# Patient Record
Sex: FEMALE | Race: OTHER | HISPANIC OR LATINO
[De-identification: names, ages, dates, MRNs, and addresses within clinical notes are randomized per-mention and may not be internally consistent; named-entity substitution may affect disease eponyms.]

---

## 2023-10-02 PROBLEM — Z00.00 ENCOUNTER FOR PREVENTIVE HEALTH EXAMINATION: Status: ACTIVE | Noted: 2023-10-02

## 2023-10-12 ENCOUNTER — NON-APPOINTMENT (OUTPATIENT)
Age: 33
End: 2023-10-12

## 2023-10-12 ENCOUNTER — APPOINTMENT (OUTPATIENT)
Dept: BARIATRICS | Facility: CLINIC | Age: 33
End: 2023-10-12
Payer: MEDICAID

## 2023-10-12 VITALS
BODY MASS INDEX: 44.93 KG/M2 | DIASTOLIC BLOOD PRESSURE: 74 MMHG | HEART RATE: 67 BPM | TEMPERATURE: 96.7 F | WEIGHT: 238 LBS | HEIGHT: 61 IN | SYSTOLIC BLOOD PRESSURE: 116 MMHG | OXYGEN SATURATION: 99 %

## 2023-10-12 DIAGNOSIS — Z78.9 OTHER SPECIFIED HEALTH STATUS: ICD-10-CM

## 2023-10-12 DIAGNOSIS — E66.01 MORBID (SEVERE) OBESITY DUE TO EXCESS CALORIES: ICD-10-CM

## 2023-10-12 DIAGNOSIS — R63.5 ABNORMAL WEIGHT GAIN: ICD-10-CM

## 2023-10-12 DIAGNOSIS — Z01.818 ENCOUNTER FOR OTHER PREPROCEDURAL EXAMINATION: ICD-10-CM

## 2023-10-12 DIAGNOSIS — Z97.5 PRESENCE OF (INTRAUTERINE) CONTRACEPTIVE DEVICE: ICD-10-CM

## 2023-10-12 DIAGNOSIS — Z86.39 PERSONAL HISTORY OF OTHER ENDOCRINE, NUTRITIONAL AND METABOLIC DISEASE: ICD-10-CM

## 2023-10-12 PROCEDURE — 99205 OFFICE O/P NEW HI 60 MIN: CPT

## 2023-11-02 ENCOUNTER — APPOINTMENT (OUTPATIENT)
Dept: BARIATRICS/WEIGHT MGMT | Facility: CLINIC | Age: 33
End: 2023-11-02
Payer: MEDICAID

## 2023-11-02 VITALS — BODY MASS INDEX: 44.93 KG/M2 | WEIGHT: 238 LBS | HEIGHT: 61 IN

## 2023-11-02 PROCEDURE — 97802 MEDICAL NUTRITION INDIV IN: CPT

## 2023-12-04 ENCOUNTER — APPOINTMENT (OUTPATIENT)
Dept: BARIATRICS/WEIGHT MGMT | Facility: CLINIC | Age: 33
End: 2023-12-04

## 2023-12-13 ENCOUNTER — APPOINTMENT (OUTPATIENT)
Dept: BARIATRICS | Facility: CLINIC | Age: 33
End: 2023-12-13
Payer: MEDICAID

## 2023-12-13 VITALS
SYSTOLIC BLOOD PRESSURE: 114 MMHG | WEIGHT: 239 LBS | OXYGEN SATURATION: 99 % | BODY MASS INDEX: 45.12 KG/M2 | DIASTOLIC BLOOD PRESSURE: 68 MMHG | HEART RATE: 71 BPM | HEIGHT: 61 IN | TEMPERATURE: 96.7 F

## 2023-12-13 PROCEDURE — 99213 OFFICE O/P EST LOW 20 MIN: CPT

## 2023-12-13 NOTE — HISTORY OF PRESENT ILLNESS
[de-identified] : This is a 33 year-year-old morbidly obese woman presenting today to discuss surgical options for weight loss. Despite multiple efforts at diet and exercise this patient has been unable to achieve and/or maintain significant weight loss.  Works in a [employer].

## 2023-12-13 NOTE — PHYSICAL EXAM
[Obese, well nourished, in no acute distress] : obese, well nourished, in no acute distress [Normal] : affect appropriate [de-identified] : Obese, soft, nontender, nondistended, positive bowel sounds in all four quadrants.  No hernia or masses.

## 2023-12-13 NOTE — REASON FOR VISIT
[Follow-Up Visit] : a follow-up visit for [Morbid Obesity (BMI>40)] : morbid obesity (bmi>40) [[employer] ] : provided by [employer]   [Interpreters_IDNumber] : [id number] [Interpreters_FullName] : Pao [TWNoteComboBox1] : Pakistani

## 2023-12-13 NOTE — ASSESSMENT
[FreeTextEntry1] : Ongoing preoperative assessment in preparation for laparoscopic sleeve gastrectomy.  Patient has made little progress since her initial consultation she returns today without significant weight change.  Up 1 pound.  Body mass index of 45.16 kg/m.  Patient has undergone nutritional consultation.  Still pending however includes a letter of support, diet history, blood work, endoscopy, pulmonary clearance, cardiac clearance.  Several of these appointments have been scheduled.  The patient reports her endoscopy to be scheduled for February 15, she is due to see the pulmonologist on January 28 and the cardiologist on January 29.  Once again, Nutritional and weight loss counseling has been provided. The importance of weight reduction in the treatment of Obesity and its contribution to resolution of obesity related comorbidities has been discussed.  The patient is encouraged to remain calorie conscious and continue a low fat, low carbohydrate, high protein diet. Eat slowly and chew food well.  Avoid eating and drinking simultaneously.  Also, emphasis has been placed on the importance of adequate hydration, multi-vitamin supplementation and exercise.  (15 min)  f/u in one month.

## 2023-12-14 ENCOUNTER — APPOINTMENT (OUTPATIENT)
Dept: BARIATRICS | Facility: CLINIC | Age: 33
End: 2023-12-14

## 2024-01-10 ENCOUNTER — APPOINTMENT (OUTPATIENT)
Dept: BARIATRICS/WEIGHT MGMT | Facility: CLINIC | Age: 34
End: 2024-01-10

## 2024-01-11 ENCOUNTER — APPOINTMENT (OUTPATIENT)
Dept: BARIATRICS | Facility: CLINIC | Age: 34
End: 2024-01-11

## 2024-01-11 ENCOUNTER — APPOINTMENT (OUTPATIENT)
Dept: BARIATRICS/WEIGHT MGMT | Facility: CLINIC | Age: 34
End: 2024-01-11
Payer: MEDICAID

## 2024-01-11 PROCEDURE — 97803 MED NUTRITION INDIV SUBSEQ: CPT

## 2024-01-24 ENCOUNTER — TRANSCRIPTION ENCOUNTER (OUTPATIENT)
Age: 34
End: 2024-01-24

## 2024-01-24 ENCOUNTER — APPOINTMENT (OUTPATIENT)
Dept: BARIATRICS | Facility: CLINIC | Age: 34
End: 2024-01-24
Payer: MEDICAID

## 2024-01-24 VITALS
TEMPERATURE: 96.7 F | DIASTOLIC BLOOD PRESSURE: 74 MMHG | BODY MASS INDEX: 45.88 KG/M2 | WEIGHT: 243 LBS | HEART RATE: 78 BPM | HEIGHT: 61 IN | OXYGEN SATURATION: 99 % | SYSTOLIC BLOOD PRESSURE: 116 MMHG

## 2024-01-24 VITALS
HEART RATE: 78 BPM | SYSTOLIC BLOOD PRESSURE: 116 MMHG | TEMPERATURE: 96.7 F | OXYGEN SATURATION: 99 % | HEIGHT: 61 IN | BODY MASS INDEX: 45.88 KG/M2 | DIASTOLIC BLOOD PRESSURE: 74 MMHG | WEIGHT: 243 LBS

## 2024-01-24 DIAGNOSIS — R63.8 OTHER SYMPTOMS AND SIGNS CONCERNING FOOD AND FLUID INTAKE: ICD-10-CM

## 2024-01-24 PROCEDURE — 99214 OFFICE O/P EST MOD 30 MIN: CPT

## 2024-01-24 NOTE — PHYSICAL EXAM
[Obese] : obese [Normal] : affect appropriate [de-identified] : Equal chest rise, non-labored respirations, no audible wheezing [de-identified] :  Soft, NT, ND, no diastasis or hernias appreciated [de-identified] : BOBY

## 2024-01-24 NOTE — HISTORY OF PRESENT ILLNESS
[de-identified] : 33 year old F presents for follow-up while undergoing workup for Laparoscopic Sleeve Gastrectomy. Weight stable since last visit. Pt continues to try to make better food choices, consuming 2-3 meals/day, sometimes skipping dinner due to work schedule. Trying to drink adequate zero calorie liquid. Pt trying to exercise walk more and goes to gym 2x/week for cardio. No abdominal pain, reflux, nausea, vomiting, constipation or diarrhea. Albanian interpretation provided by [employer] : Jasmyne ID# [id number]

## 2024-01-24 NOTE — ASSESSMENT
[FreeTextEntry1] : 33 year old F presents for follow-up while undergoing workup for Laparoscopic Sleeve Gastrectomy. Weight stable since last visit. Pt continues to try to make better food choices in anticipation of bariatric surgery  Reviewed nutrition and exercise guidelines Protein focus diet and 3 meals/day Increase daily zero calorie liquid intake - goal 64oz/day Increase activities and keep track of steps - goal 8-10k steps/day; continue cardio and add strength training Stress reduction modalities  Continue workup for Laparoscopic Sleeve Gastrectomy Continue multicomponent intervention visits Awaiting labs Cardiology consultation set for 1/29/2024 Awaiting pulmonary and GI appointments Nutritionist Sara Sierra evaluation completed 1/11/2024 Dr. DIANE Heard Psychologist evaluation scheduled for 2/27/2024 All questions answered Additional time spent before and after visit reviewing chart.

## 2024-01-29 ENCOUNTER — NON-APPOINTMENT (OUTPATIENT)
Age: 34
End: 2024-01-29

## 2024-01-29 ENCOUNTER — APPOINTMENT (OUTPATIENT)
Dept: CARDIOLOGY | Facility: CLINIC | Age: 34
End: 2024-01-29
Payer: MEDICAID

## 2024-01-29 VITALS
SYSTOLIC BLOOD PRESSURE: 140 MMHG | DIASTOLIC BLOOD PRESSURE: 78 MMHG | BODY MASS INDEX: 45.12 KG/M2 | HEIGHT: 61 IN | WEIGHT: 239 LBS | OXYGEN SATURATION: 97 % | HEART RATE: 89 BPM

## 2024-01-29 DIAGNOSIS — Z01.810 ENCOUNTER FOR PREPROCEDURAL CARDIOVASCULAR EXAMINATION: ICD-10-CM

## 2024-01-29 PROCEDURE — 93000 ELECTROCARDIOGRAM COMPLETE: CPT | Mod: NC

## 2024-01-29 PROCEDURE — 99204 OFFICE O/P NEW MOD 45 MIN: CPT | Mod: 25

## 2024-02-16 ENCOUNTER — APPOINTMENT (OUTPATIENT)
Dept: CARDIOLOGY | Facility: CLINIC | Age: 34
End: 2024-02-16
Payer: MEDICAID

## 2024-02-16 PROCEDURE — 93015 CV STRESS TEST SUPVJ I&R: CPT

## 2024-02-16 NOTE — DISCUSSION/SUMMARY
[EKG obtained to assist in diagnosis and management of assessed problem(s)] : EKG obtained to assist in diagnosis and management of assessed problem(s) [FreeTextEntry1] : Exercise stress test to eval exercise capacity, pre-op risk stratification. No indication for echo or other additional testing at this time. Clearance pending above.  Addendum 2/16/24 - Exercise stress test negative. No cardiac contraindications for surgery. Patient is at low estimated cardiac risk for bariatric surgery.

## 2024-02-16 NOTE — HISTORY OF PRESENT ILLNESS
[FreeTextEntry1] : RICK CONTEH is a 33 year old female Sami speaking referred for cardiac clearance prior to bariatric surgery. No prior cardiac history. No complaints. She exercises but not regularly. She reports some fatigue and shortness of breath on more severe exertion. No stairs at home.  She works as a cook. Meds - none. Soc - Former cigarette smoking, quit Sept 2023, max 1 ppw. Fam hist - No cardiac history.

## 2024-02-20 VITALS
DIASTOLIC BLOOD PRESSURE: 68 MMHG | TEMPERATURE: 97 F | HEART RATE: 83 BPM | SYSTOLIC BLOOD PRESSURE: 113 MMHG | OXYGEN SATURATION: 98 % | RESPIRATION RATE: 20 BRPM

## 2024-02-20 NOTE — ASU PATIENT PROFILE, ADULT - FALL HARM RISK - UNIVERSAL INTERVENTIONS
Bed in lowest position, wheels locked, appropriate side rails in place/Call bell, personal items and telephone in reach/Instruct patient to call for assistance before getting out of bed or chair/Non-slip footwear when patient is out of bed/[place] to call system/Physically safe environment - no spills, clutter or unnecessary equipment/Purposeful Proactive Rounding/Room/bathroom lighting operational, light cord in reach

## 2024-02-21 ENCOUNTER — OUTPATIENT (OUTPATIENT)
Dept: OUTPATIENT SERVICES | Facility: HOSPITAL | Age: 34
LOS: 1 days | Discharge: HOME | End: 2024-02-21
Payer: MEDICAID

## 2024-02-21 ENCOUNTER — TRANSCRIPTION ENCOUNTER (OUTPATIENT)
Age: 34
End: 2024-02-21

## 2024-02-21 VITALS
RESPIRATION RATE: 15 BRPM | DIASTOLIC BLOOD PRESSURE: 64 MMHG | SYSTOLIC BLOOD PRESSURE: 106 MMHG | OXYGEN SATURATION: 98 % | HEART RATE: 80 BPM

## 2024-02-21 DIAGNOSIS — R12 HEARTBURN: ICD-10-CM

## 2024-02-21 LAB — HCG UR QL: NEGATIVE — SIGNIFICANT CHANGE UP

## 2024-02-21 PROCEDURE — 88305 TISSUE EXAM BY PATHOLOGIST: CPT | Mod: 26

## 2024-02-21 RX ORDER — SODIUM CHLORIDE 9 MG/ML
500 INJECTION, SOLUTION INTRAVENOUS
Refills: 0 | Status: DISCONTINUED | OUTPATIENT
Start: 2024-02-21 | End: 2024-03-06

## 2024-02-21 NOTE — PRE PROCEDURE NOTE - PRE PROCEDURE EVALUATION
Attending Physician:   Ara                         Procedure: EGD    Indication for Procedure: prebariatric surgery; occasional heartburn, postprandial bloating   ________________________________________________________  PAST MEDICAL & SURGICAL HISTORY:    ALLERGIES:    HOME MEDICATIONS:      PERTINENT LAB DATA:                      PHYSICAL EXAMINATION:    T(C): --  HR: --  BP: --  RR: --  SpO2: --    Constitutional: NAD  HEENT: Anicteric, EOMI   Neck:  No JVD  Respiratory: Normal respiratory effort, no accessory muscle use  Cardiovascular: S1 and S2, normal rate  Gastrointestinal: BS+, soft, NT/ND  Extremities: No peripheral edema  Neurological: A/O x 3, no focal deficits  Psychiatric: Normal mood, normal affect  Skin: No rashes on exposed skin    COMMENTS:    The patient is a suitable candidate for the planned procedure unless box checked [ ]  No, explain:

## 2024-02-22 ENCOUNTER — APPOINTMENT (OUTPATIENT)
Dept: BARIATRICS | Facility: CLINIC | Age: 34
End: 2024-02-22
Payer: MEDICAID

## 2024-02-22 VITALS
TEMPERATURE: 96.7 F | SYSTOLIC BLOOD PRESSURE: 140 MMHG | HEIGHT: 61 IN | BODY MASS INDEX: 45.88 KG/M2 | OXYGEN SATURATION: 99 % | WEIGHT: 243 LBS | DIASTOLIC BLOOD PRESSURE: 76 MMHG | HEART RATE: 81 BPM

## 2024-02-22 DIAGNOSIS — R63.2 POLYPHAGIA: ICD-10-CM

## 2024-02-22 DIAGNOSIS — E46 UNSPECIFIED PROTEIN-CALORIE MALNUTRITION: ICD-10-CM

## 2024-02-22 PROCEDURE — 99215 OFFICE O/P EST HI 40 MIN: CPT

## 2024-02-22 PROCEDURE — T1013A: CUSTOM

## 2024-02-22 NOTE — PHYSICAL EXAM
[Normal] : affect appropriate [de-identified] : Well, healthy appearing adult  [de-identified] : No visualized JVD or audible bruits  [de-identified] : EOMI, no conjunctival injection, anicteric  [de-identified] : Equal chest rise, non-labored respirations, no audible wheezing  [de-identified] : soft, NT, ND, no diastasis or hernias appreciated  [de-identified] : Regular rate, no audible carotid bruits or JVD appreciated  [de-identified] : BOBY

## 2024-02-22 NOTE — ADDENDUM
[FreeTextEntry1] : Pt seen along with PA.   services provided (Slovenian) Near complete with preoperative workup. Has undergon EGD yesterday with biopsy.  May take up to 2 weeks to get results Cleared by Cardiology Seen by Nutrition and Psych Plan for return in one month to reassess weight loss progress and review remaining clearances and blood work. Nutritional and weight loss counseling has been provided. The importance of weight reduction in the treatment of Obesity and its contribution to resolution of obesity related comorbidities has been discussed.  The patient is encouraged to remain calorie conscious and continue a low fat, low carbohydrate, high protein diet. Eat slowly and chew food well.  Avoid eating and drinking simultaneously.  Also, emphasis has been placed on the importance of adequate hydration, multi-vitamin supplementation and exercise.  (15 min)

## 2024-02-22 NOTE — HISTORY OF PRESENT ILLNESS
[de-identified] : 33 year old F undergoing workup for Laparoscopic Sleeve Gastrectomy. Weight stable since last visit. Pt continues to try to make better food choices. Trying to walk more. Sleeping fluctuates b/w 5-8hr/night. No abdominal pain, reflux, nausea, vomiting, constipation or diarrhea.

## 2024-02-22 NOTE — REASON FOR VISIT
[Follow-Up Visit] : a follow-up visit for [Morbid Obesity (BMI>40)] : morbid obesity (bmi>40) [Other: ______] : provided by [employer] [Time Spent: ____ minutes] : Total time spent using  services: [unfilled] minutes. The patient's primary language is not English thus required  services. [Interpreters_IDNumber] : [id number] [Interpreters_FullName] : Edgardo [TWNoteComboBox1] : Luxembourger

## 2024-02-22 NOTE — ASSESSMENT
[FreeTextEntry1] : 33 year old F undergoing workup for Laparoscopic Sleeve Gastrectomy. Weight stable since last visit. Doing well overall.  Reviewed nutrition and exercise guidelines Protein focus diet and 3 meals/day Increase daily zero calorie liquid intake - goal 64oz/day  Increase activities and keep track of steps - goal 8-10k steps/day Limit caffeine intake  Continue workup for Laparoscopic Sleeve Gastrectomy 4 weigh-ins completed Labs ordered - pt to get bloodwork done, pt understands to go to  labs Pulmonary/GI evaluations (EGD 2/21/2024) in progress Cardiology testings completed Dr. DIANE Heard Psychologist evaluations as scheduled Nutritionist Dara Carson completed All questions answered   Return to office in one month once work up is completed Pt seen with Dr. Diaz   Additional time spent before and after visit reviewing chart

## 2024-02-26 LAB — SURGICAL PATHOLOGY STUDY: SIGNIFICANT CHANGE UP

## 2024-02-27 ENCOUNTER — APPOINTMENT (OUTPATIENT)
Dept: BARIATRICS/WEIGHT MGMT | Facility: CLINIC | Age: 34
End: 2024-02-27
Payer: MEDICAID

## 2024-02-27 DIAGNOSIS — Z78.9 OTHER SPECIFIED HEALTH STATUS: ICD-10-CM

## 2024-02-27 DIAGNOSIS — Z87.891 PERSONAL HISTORY OF NICOTINE DEPENDENCE: ICD-10-CM

## 2024-02-27 DIAGNOSIS — F17.200 NICOTINE DEPENDENCE, UNSPECIFIED, UNCOMPLICATED: ICD-10-CM

## 2024-02-27 PROCEDURE — 90785 PSYTX COMPLEX INTERACTIVE: CPT

## 2024-02-27 PROCEDURE — 90791 PSYCH DIAGNOSTIC EVALUATION: CPT | Mod: GT,95

## 2024-02-27 NOTE — PHYSICAL EXAM
[Normal] : good [VH] : no visual hallucinations [AH] : no auditory hallucinations [Suicidal Ideation] : no suicidal ideation [Homicidal Ideation] : no homicidal ideation [FreeTextEntry8] : "good" [de-identified] : Tunisian speaking

## 2024-02-27 NOTE — HISTORY OF PRESENT ILLNESS
[Decrease Activity] : decrease activity [Poor Choices] : poor choices [de-identified] : Pt's motivation for surgery is to improve her confidence and overall health.  Per pt,her highest weight is her current weight of 243 lbs and her lowest weight was 125-130 lbs at 19 yrs old.  Her stated goal weight is 130-140 lbs.  She understands that this may not be a realistic goal weight and was educated re: typical weight loss post-surgery and states that she would be satisfied with any significant weight loss. [de-identified] : sleeve gastrectomy  [de-identified] : 1.5 yrs:  speaking with others who have had bariatric surgery; discussions with surgeon and RD; and reading educational materials provided by surgeon [de-identified] : grazing while working [de-identified] : none.  Pt denies ED hx and bxs, including binge eating. [de-identified] : A current typical day of eating is reported as follows: B: eggs and tortilla w/beans L:  rice with chicken/meat or salad w/chicken or tuna salad  D:  eggs or tuna salad Drinks water and 1 cup of coffee/day (down from 10 cups).    [de-identified] : OTC diet pills, laxative teas, low fat diet, portion control, avoiding snacking and increased walking.  Despite multiple attempts at diet and exercise modifications, patient reports inability to achieve and maintain significant weight loss.

## 2024-02-27 NOTE — REASON FOR VISIT
[Other Location: e.g. School (Enter Location, City,State)___] : at [unfilled], at the time of the visit. [Other Location: e.g. Home (Enter Location, City,State)___] : at [unfilled] [Other: ______] : provided by [facility] [Time Spent: ____ minutes] : Total time spent using  services: [unfilled] minutes. The patient's primary language is not English thus required  services. [Patient] : the patient [Initial Consult] : an initial consult for [Morbid Obesity (BMI>40)] : morbid obesity (bmi>40) [Referring By:  ___] : ~Connor Ln~ was referred for psychological evaluation by Dr. RO [Attempted Weight Loss] : attempted weight loss [Commitment to Modified Lifestyle] : commitment to a modified lifestyle pre and post surgery [Difficulties with Diet Compliance] : difficulties with diet compliance  [Expectations of Outcome] : expectations of outcome [Motivation for Selecting Surgery] : motivation for selecting surgery [Strength of Social Support System] : strength of social support system [Patient Understands Data May be Shared] : patient understands that the information discussed during the evaluation would be shared with referring provider and possibly with ~his/her~ insurance provider [Interpreters_IDNumber] : [id number] [Interpreters_FullName] : Linnette [TWNoteComboBox1] : Djiboutian [de-identified] : for evaluation of psychological appropriateness for bariatric surgery [de-identified] : Confidentiality and its limitations were explained.

## 2024-02-27 NOTE — SOCIAL HISTORY
[Employed] : employed [Never ] : never  [# Of Children ___] : has [unfilled] children [High School] : high school [None] : none [FreeTextEntry1] : resides with her mother, her 2 children, her sister and her sister's 2 children [FreeTextEntry2] : vicki beth [facility] [FreeTextEntry3] : who are ages 11 and 13 yrs old

## 2024-02-27 NOTE — CURRENT PSYCHIATRIC SYMPTOMS
[Depressed Mood] : no depressed mood [Decreased Concentration] : no decrease in concentrating ability [Insomnia] : no insomnia disorder [Euphoria] : no euphoria [Dec Need For Sleep] : no decreased need for sleep [Thought Disorder] : ~T a thought disorder was not noted [Excessive Worry] : no excessive worries [Panic] : no panic disorder [FreeTextEntry1] : Pt denies all psyc tx hx.

## 2024-02-27 NOTE — DISCUSSION/SUMMARY
[Behavior plan developed] : Behavior plan developed [Strategies to improve adherence identified] : Strategies to improve adherence identified [Addtnl Health & Behavior Intervention: Individual] : Additional Health and Behavior Intervention: Individual [Develop and refine illness management to behavior plan] : Develop and refine illness management to behavior plan [Identify, practice refine strategies to promote adherence to regimen] : Identify, practice refine strategies to promote adherence to regimen [FreeTextEntry1] : Based on the information presented in this assessment, Ms. CROSS does not have any current psychological contraindications for bariatric surgery. [de-identified] : Psychological factors (poor dietary choices, grazing) affecting other medical conditions (obesity) Obesity [FreeTextEntry3] : Behavioral strategies were discussed to increase her coping skills and assist her in making lifestyle modifications.  It was recommended that she utilize writer and RD as needed to assist in making pre-surgical and post-surgical dietary and behavioral changes. [FreeTextEntry5] : compliance with dietary and behavioral recommendations  [FreeTextEntry6] : reduced risk of medical sequelae of obesity

## 2024-04-25 ENCOUNTER — APPOINTMENT (OUTPATIENT)
Dept: BARIATRICS | Facility: CLINIC | Age: 34
End: 2024-04-25
Payer: MEDICAID

## 2024-04-25 VITALS
HEIGHT: 61 IN | HEART RATE: 81 BPM | TEMPERATURE: 96.7 F | WEIGHT: 246 LBS | DIASTOLIC BLOOD PRESSURE: 80 MMHG | SYSTOLIC BLOOD PRESSURE: 140 MMHG | OXYGEN SATURATION: 99 % | BODY MASS INDEX: 46.44 KG/M2

## 2024-04-25 DIAGNOSIS — R63.5 ABNORMAL WEIGHT GAIN: ICD-10-CM

## 2024-04-25 PROCEDURE — 99213 OFFICE O/P EST LOW 20 MIN: CPT

## 2024-04-25 NOTE — PHYSICAL EXAM
[Obese, well nourished, in no acute distress] : obese, well nourished, in no acute distress [Normal] : affect appropriate [de-identified] : Obese, soft, nontender, nondistended, positive bowel sounds in all four quadrants.  No hernia or masses.

## 2024-04-25 NOTE — HISTORY OF PRESENT ILLNESS
[de-identified] : This is a 33 year-year-old morbidly obese woman presenting today to discuss surgical options for weight loss. Despite multiple efforts at diet and exercise this patient has been unable to achieve and/or maintain significant weight loss.  Works in a [employer]. [de-identified] : Ongoing evaluation in anticipation of Sleeve Gastrectomy

## 2024-04-25 NOTE — REASON FOR VISIT
[Follow-Up Visit] : a follow-up visit for [Morbid Obesity (BMI>40)] : morbid obesity (bmi>40) [[employer] ] : provided by [employer]   [Interpreters_IDNumber] : [id number] [Interpreters_FullName] : Eduardo [TWNoteComboBox1] : Niuean

## 2024-04-25 NOTE — ASSESSMENT
[FreeTextEntry1] : Ongoing preoperative assessment in preparation for laparoscopic sleeve gastrectomy.  Preoperative evaluations and clearances have been reviewed.  The patient has been seen by her nutritionist, psychologist, cardiologist as well as gastroenterologist without contraindications for surgery.  Still pending however is a letter of support from her primary care physician, preoperative blood work, diet history, pulmonary evaluation. This is all been reviewed and discussed with the patient via translation services and all questions have been answered. The patient reports that she is scheduled to see her primary care physician tomorrow at which time she plans to have the blood work performed as well as obtain the necessary referrals for pulmonary evaluation to rule out obstructive sleep apnea.  The patient is gained 3 pounds since her previous encounter.  Once again, Nutritional and weight loss counseling has been provided. The importance of weight reduction in the treatment of Obesity and its contribution to resolution of obesity related comorbidities has been discussed.  The patient is encouraged to remain calorie conscious and continue a low fat, low carbohydrate, high protein diet. Eat slowly and chew food well.  Avoid eating and drinking simultaneously.  Also, emphasis has been placed on the importance of adequate hydration, multi-vitamin supplementation and exercise.  (15 min)  Follow-up in 1 month at which time I hope to receive the remaining clearance evaluations and blood work so that we may move forward and discuss dates for surgery.

## 2024-05-23 ENCOUNTER — APPOINTMENT (OUTPATIENT)
Dept: BARIATRICS | Facility: CLINIC | Age: 34
End: 2024-05-23
Payer: MEDICAID

## 2024-05-23 VITALS
BODY MASS INDEX: 45.5 KG/M2 | DIASTOLIC BLOOD PRESSURE: 82 MMHG | WEIGHT: 241 LBS | HEIGHT: 61 IN | HEART RATE: 78 BPM | TEMPERATURE: 97.1 F | SYSTOLIC BLOOD PRESSURE: 120 MMHG | OXYGEN SATURATION: 99 %

## 2024-05-23 DIAGNOSIS — E66.01 MORBID (SEVERE) OBESITY DUE TO EXCESS CALORIES: ICD-10-CM

## 2024-05-23 DIAGNOSIS — Z13.228 ENCOUNTER FOR SCREENING FOR OTHER SUSPECTED ENDOCRINE DISORDER: ICD-10-CM

## 2024-05-23 DIAGNOSIS — Z13.0 ENCOUNTER FOR SCREENING FOR OTHER SUSPECTED ENDOCRINE DISORDER: ICD-10-CM

## 2024-05-23 DIAGNOSIS — Z13.29 ENCOUNTER FOR SCREENING FOR OTHER SUSPECTED ENDOCRINE DISORDER: ICD-10-CM

## 2024-05-23 PROCEDURE — 99214 OFFICE O/P EST MOD 30 MIN: CPT

## 2024-05-23 NOTE — ASSESSMENT
[FreeTextEntry1] : Nut, Psych, Card, GI complete. Still pending Letter of Support, Diet Hx, and Pulm evaluation.  Scheduled to undergo Sleep Study 7/6. Has not yet completed blood work.  New Rx provided.  Dwon 5 lbs.  f/u after Sleep Study and Pulm evaluation to determine readiness for surgery.  Hopefully can be scheduled for late July vs early August.  Nutritional and weight loss counseling has been provided. The importance of weight reduction in the treatment of Obesity and its contribution to resolution of obesity related comorbidities has been discussed.  The patient is encouraged to remain calorie conscious and continue a low fat, low carbohydrate, high protein diet. Eat slowly and chew food well.  Avoid eating and drinking simultaneously.  Also, emphasis has been placed on the importance of adequate hydration, multi-vitamin supplementation and exercise.  (15 min)   As a woman of childbearing age, we discussed avoidance of becoming pregnant now and for at least 18 months following surgery or until weight loss has plateaued and she can maintain adequate nutritional stability.

## 2024-05-23 NOTE — PHYSICAL EXAM
[Obese, well nourished, in no acute distress] : obese, well nourished, in no acute distress [Normal] : affect appropriate [de-identified] : Obese, soft, nontender, nondistended, positive bowel sounds in all four quadrants.  No hernia or masses.

## 2024-05-23 NOTE — REASON FOR VISIT
[Follow-Up Visit] : a follow-up visit for [Morbid Obesity (BMI>40)] : morbid obesity (bmi>40) [[employer] ] : provided by [employer]   [Interpreters_IDNumber] : [id number] [Interpreters_FullName] : Etta [TWNoteComboBox1] : Kyrgyz

## 2024-05-23 NOTE — HISTORY OF PRESENT ILLNESS
[de-identified] : Ongoing preoperative assessment in preparation for laparoscopic sleeve gastrectomy

## 2024-08-22 ENCOUNTER — APPOINTMENT (OUTPATIENT)
Dept: BARIATRICS | Facility: CLINIC | Age: 34
End: 2024-08-22

## 2024-08-22 VITALS
DIASTOLIC BLOOD PRESSURE: 74 MMHG | WEIGHT: 242.6 LBS | TEMPERATURE: 97.1 F | SYSTOLIC BLOOD PRESSURE: 124 MMHG | BODY MASS INDEX: 45.8 KG/M2 | HEART RATE: 79 BPM | HEIGHT: 61 IN | OXYGEN SATURATION: 97 %

## 2024-08-22 PROCEDURE — 99213 OFFICE O/P EST LOW 20 MIN: CPT

## 2024-09-05 PROBLEM — G47.33 OSA ON CPAP: Status: ACTIVE | Noted: 2024-09-05

## 2024-09-24 DIAGNOSIS — E55.9 VITAMIN D DEFICIENCY, UNSPECIFIED: ICD-10-CM

## 2024-09-24 DIAGNOSIS — K91.2 POSTSURGICAL MALABSORPTION, NOT ELSEWHERE CLASSIFIED: ICD-10-CM

## 2024-09-24 DIAGNOSIS — Z00.00 ENCOUNTER FOR GENERAL ADULT MEDICAL EXAMINATION W/OUT ABNORMAL FINDINGS: ICD-10-CM

## 2024-09-24 DIAGNOSIS — Z90.3 POSTSURGICAL MALABSORPTION, NOT ELSEWHERE CLASSIFIED: ICD-10-CM

## 2024-09-24 DIAGNOSIS — E56.9 VITAMIN DEFICIENCY, UNSPECIFIED: ICD-10-CM

## 2024-09-25 ENCOUNTER — APPOINTMENT (OUTPATIENT)
Dept: BARIATRICS/WEIGHT MGMT | Facility: CLINIC | Age: 34
End: 2024-09-25

## 2024-09-25 VITALS — WEIGHT: 243 LBS | HEIGHT: 61 IN | BODY MASS INDEX: 45.88 KG/M2

## 2024-09-25 PROCEDURE — 90834 PSYTX W PT 45 MINUTES: CPT | Mod: GT,95

## 2024-09-25 PROCEDURE — 90785 PSYTX COMPLEX INTERACTIVE: CPT | Mod: 95

## 2024-09-25 PROCEDURE — T1013: CPT

## 2024-09-25 NOTE — REASON FOR VISIT
[Follow-Up Visit] : a follow-up visit for [Morbid Obesity (BMI>40)] : morbid obesity (bmi>40) [Referring By:  ___] : ~Connor Ln~ was referred for psychological evaluation by Dr. RO [Attempted Weight Loss] : attempted weight loss [Commitment to Modified Lifestyle] : commitment to a modified lifestyle pre and post surgery [Difficulties with Diet Compliance] : difficulties with diet compliance  [Expectations of Outcome] : expectations of outcome [Motivation for Selecting Surgery] : motivation for selecting surgery [Strength of Social Support System] : strength of social support system [Patient Understands Data May be Shared] : patient understands that the information discussed during the evaluation would be shared with referring provider and possibly with ~his/her~ insurance provider [Home] : at home, [unfilled] , at the time of the visit. [Other Location: e.g. Home (Enter Location, City,State)___] : at [unfilled] [Other: ______] : provided by [facility] [Patient] : the patient [Time Spent: ____ minutes] : Total time spent using  services: [unfilled] minutes. The patient's primary language is not English thus required  services. [de-identified] : for re-evaluation of psychological appropriateness for bariatric surgery [de-identified] : Confidentiality and its limitations were explained. [Interpreters_IDNumber] : [id number] [Interpreters_FullName] : Linnette [TWNoteComboBox1] : Lao

## 2024-09-25 NOTE — DISCUSSION/SUMMARY
[Behavior plan developed] : Behavior plan developed [Strategies to improve adherence identified] : Strategies to improve adherence identified [Addtnl Health & Behavior Intervention: Individual] : Additional Health and Behavior Intervention: Individual [Develop and refine illness management to behavior plan] : Develop and refine illness management to behavior plan [Identify, practice refine strategies to promote adherence to regimen] : Identify, practice refine strategies to promote adherence to regimen [FreeTextEntry1] : Based on the information presented in this and previous assessment, Ms. CROSS does not have any current psychological contraindications for bariatric surgery. [de-identified] : Psychological factors (overeating, poor dietary choices) affecting other medical conditions (obesity and associated medical sequelae) Obesity [FreeTextEntry3] : Reminded pt of services available pre- and post-surgically to assist in making and maintaining dietary and behavioral changes. Pt agreed to contact [facility] as needed for support.   [FreeTextEntry5] : compliance with dietary and behavioral recommendations  [FreeTextEntry6] : reduced risk of medical sequelae of obesity

## 2024-09-25 NOTE — SOCIAL HISTORY
[Employed] : employed [Never ] : never  [# Of Children ___] : has [unfilled] children [High School] : high school [None] : none [FreeTextEntry1] : resides with her mother, her 2 children, her sister and her sister's 2 children [FreeTextEntry2] : vicki beth [facility] [FreeTextEntry3] : who are ages 11 and 13 yrs old

## 2024-09-25 NOTE — PHYSICAL EXAM
[Normal] : good [AH] : no auditory hallucinations [VH] : no visual hallucinations [Suicidal Ideation] : no suicidal ideation [Homicidal Ideation] : no homicidal ideation [FreeTextEntry8] : "fine" [de-identified] : Canadian speaking

## 2024-09-25 NOTE — HISTORY OF PRESENT ILLNESS
[Decrease Activity] : decrease activity [Quantity Eating] : quantity eating [Poor Choices] : poor choices [de-identified] : Pt presents for re-evaluation as her initial eval was conducted over 6 mths ago.  Her motivation for surgery continues to be to improve her confidence and overall health.  Per pt,her highest weight is her current weight of 243 lbs and her lowest weight was 125-130 lbs at 19 yrs old.  Her stated goal weight is 140-160 lbs and she expresses intent to make behavioral and dietary changes towards obtaining optimal results. [de-identified] : sleeve gastrectomy  [de-identified] : 2 yrs:  speaking with others who have had bariatric surgery; discussions with surgeon and RD; and reading educational materials provided by surgeon [de-identified] : grazing while working [de-identified] : none.  Pt denies ED hx and bxs, including binge eating. [de-identified] : A current typical day of eating is reported as follows: B: beans, eggs and cheese L:  rice with chicken/meat or salad w/chicken  S:  sometimes tortillas, beans and cheese D:  tortillas, eggs and cheese Drinks water and 3-4 cups of coffee/day (down from 10 cups).    [de-identified] : OTC diet pills, laxative teas, low fat diet, portion control, avoiding snacking and increased walking.  Despite multiple attempts at diet and exercise modifications, patient reports inability to achieve and maintain significant weight loss.

## 2024-10-30 ENCOUNTER — APPOINTMENT (OUTPATIENT)
Dept: BARIATRICS/WEIGHT MGMT | Facility: CLINIC | Age: 34
End: 2024-10-30

## 2024-10-30 ENCOUNTER — APPOINTMENT (OUTPATIENT)
Dept: CARDIOLOGY | Facility: CLINIC | Age: 34
End: 2024-10-30
Payer: MEDICAID

## 2024-10-30 ENCOUNTER — NON-APPOINTMENT (OUTPATIENT)
Age: 34
End: 2024-10-30

## 2024-10-30 VITALS — HEART RATE: 76 BPM | OXYGEN SATURATION: 98 % | HEIGHT: 61 IN | BODY MASS INDEX: 45.88 KG/M2 | WEIGHT: 243 LBS

## 2024-10-30 VITALS — SYSTOLIC BLOOD PRESSURE: 118 MMHG | DIASTOLIC BLOOD PRESSURE: 70 MMHG

## 2024-10-30 DIAGNOSIS — E66.01 MORBID (SEVERE) OBESITY DUE TO EXCESS CALORIES: ICD-10-CM

## 2024-10-30 DIAGNOSIS — Z01.810 ENCOUNTER FOR PREPROCEDURAL CARDIOVASCULAR EXAMINATION: ICD-10-CM

## 2024-10-30 PROCEDURE — 93000 ELECTROCARDIOGRAM COMPLETE: CPT | Mod: NC

## 2024-10-30 PROCEDURE — 99214 OFFICE O/P EST MOD 30 MIN: CPT | Mod: 25

## 2024-11-21 ENCOUNTER — APPOINTMENT (OUTPATIENT)
Dept: BARIATRICS/WEIGHT MGMT | Facility: CLINIC | Age: 34
End: 2024-11-21
Payer: MEDICAID

## 2024-11-21 ENCOUNTER — APPOINTMENT (OUTPATIENT)
Dept: BARIATRICS | Facility: CLINIC | Age: 34
End: 2024-11-21
Payer: MEDICAID

## 2024-11-21 VITALS
OXYGEN SATURATION: 98 % | HEART RATE: 86 BPM | TEMPERATURE: 98.3 F | SYSTOLIC BLOOD PRESSURE: 128 MMHG | DIASTOLIC BLOOD PRESSURE: 84 MMHG | HEIGHT: 61 IN | WEIGHT: 248.24 LBS | BODY MASS INDEX: 46.87 KG/M2

## 2024-11-21 DIAGNOSIS — R63.5 ABNORMAL WEIGHT GAIN: ICD-10-CM

## 2024-11-21 DIAGNOSIS — G47.33 OBSTRUCTIVE SLEEP APNEA (ADULT) (PEDIATRIC): ICD-10-CM

## 2024-11-21 DIAGNOSIS — E66.01 MORBID (SEVERE) OBESITY DUE TO EXCESS CALORIES: ICD-10-CM

## 2024-11-21 PROCEDURE — 97803 MED NUTRITION INDIV SUBSEQ: CPT

## 2024-11-21 PROCEDURE — 99215 OFFICE O/P EST HI 40 MIN: CPT

## 2024-11-22 PROBLEM — E66.01 MORBID OBESITY WITH BMI OF 45.0-49.9, ADULT: Status: ACTIVE | Noted: 2023-10-12

## 2024-12-04 DIAGNOSIS — Z32.00 ENCOUNTER FOR PREGNANCY TEST, RESULT UNKNOWN: ICD-10-CM

## 2024-12-04 DIAGNOSIS — Z01.812 ENCOUNTER FOR PREPROCEDURAL LABORATORY EXAMINATION: ICD-10-CM

## 2024-12-05 ENCOUNTER — NON-APPOINTMENT (OUTPATIENT)
Age: 34
End: 2024-12-05

## 2024-12-05 ENCOUNTER — OUTPATIENT (OUTPATIENT)
Dept: OUTPATIENT SERVICES | Facility: HOSPITAL | Age: 34
LOS: 1 days | End: 2024-12-05
Payer: MEDICAID

## 2024-12-05 VITALS
DIASTOLIC BLOOD PRESSURE: 73 MMHG | HEART RATE: 89 BPM | SYSTOLIC BLOOD PRESSURE: 117 MMHG | RESPIRATION RATE: 16 BRPM | WEIGHT: 248.9 LBS | OXYGEN SATURATION: 98 % | HEIGHT: 61 IN | TEMPERATURE: 98 F

## 2024-12-05 DIAGNOSIS — E66.01 MORBID (SEVERE) OBESITY DUE TO EXCESS CALORIES: ICD-10-CM

## 2024-12-05 DIAGNOSIS — Z01.818 ENCOUNTER FOR OTHER PREPROCEDURAL EXAMINATION: ICD-10-CM

## 2024-12-05 DIAGNOSIS — G47.33 OBSTRUCTIVE SLEEP APNEA (ADULT) (PEDIATRIC): ICD-10-CM

## 2024-12-05 LAB
BLD GP AB SCN SERPL QL: SIGNIFICANT CHANGE UP
HCG SERPL-ACNC: <1 MIU/ML — SIGNIFICANT CHANGE UP

## 2024-12-05 PROCEDURE — 86850 RBC ANTIBODY SCREEN: CPT

## 2024-12-05 PROCEDURE — 36415 COLL VENOUS BLD VENIPUNCTURE: CPT

## 2024-12-05 PROCEDURE — 86901 BLOOD TYPING SEROLOGIC RH(D): CPT

## 2024-12-05 PROCEDURE — G0463: CPT

## 2024-12-05 PROCEDURE — 86900 BLOOD TYPING SEROLOGIC ABO: CPT

## 2024-12-05 PROCEDURE — 84702 CHORIONIC GONADOTROPIN TEST: CPT

## 2024-12-05 NOTE — H&P PST ADULT - RESPIRATORY AND THORAX
Anesthesia Post-op Note    Patient: Roxanne Archibald  Procedure(s) Performed: SERVICE TO GASTROENTEROLOGYEGDCOLONOSCOPY  Anesthesia type: MAC    Vitals Value Taken Time   Temp 36.1 06/29/23 0804   Pulse 77 06/29/23 0804   Resp 12 06/29/23 0804   SpO2 99 06/29/23 0804   BP 84/44 06/29/23 0804         Patient Location: bedside  Post-op Vital Signs:stable  Level of Consciousness: sedated  Respiratory Status: face mask, spontaneous ventilation and unassisted  Cardiovascular stable  Hydration: euvolemic  Pain Management: adequately controlled  Handoff: Handoff to receiving clinician was performed and questions were answered  Vomiting: none  Nausea: None  Airway Patency:patent  Post-op Assessment: no complications and patient tolerated procedure well      There were no known notable events for this encounter.   details…

## 2024-12-05 NOTE — H&P PST ADULT - ASSESSMENT
33 y/o female with BMI>45  Planned surgery.- Lap sleeve gastrectomy  Will obtain medical clearance  UCG on admit  Pre op instructions provided daughter at bedside  Instructions provided on medications to continue and to take the day morning of surgery

## 2024-12-12 RX ORDER — OMEPRAZOLE 20 MG/1
20 CAPSULE, DELAYED RELEASE ORAL DAILY
Qty: 30 | Refills: 0 | Status: DISCONTINUED | COMMUNITY
Start: 2024-12-12 | End: 2024-12-12

## 2024-12-12 RX ORDER — OXYCODONE 5 MG/1
5 TABLET ORAL EVERY 6 HOURS
Qty: 3 | Refills: 0 | Status: DISCONTINUED | COMMUNITY
Start: 2024-12-12 | End: 2024-12-12

## 2024-12-12 RX ORDER — OXYCODONE 5 MG/1
5 TABLET ORAL EVERY 6 HOURS
Qty: 3 | Refills: 0 | Status: COMPLETED | COMMUNITY
Start: 2024-12-12 | End: 2024-12-13

## 2024-12-12 RX ORDER — ONDANSETRON 4 MG/1
4 TABLET, ORALLY DISINTEGRATING ORAL EVERY 8 HOURS
Qty: 15 | Refills: 0 | Status: DISCONTINUED | COMMUNITY
Start: 2024-12-12 | End: 2024-12-12

## 2024-12-12 RX ORDER — OMEPRAZOLE 20 MG/1
20 CAPSULE, DELAYED RELEASE ORAL DAILY
Qty: 30 | Refills: 0 | Status: ACTIVE | COMMUNITY
Start: 2024-12-12 | End: 1900-01-01

## 2024-12-12 RX ORDER — ONDANSETRON 4 MG/1
4 TABLET, ORALLY DISINTEGRATING ORAL EVERY 8 HOURS
Qty: 15 | Refills: 0 | Status: ACTIVE | COMMUNITY
Start: 2024-12-12 | End: 1900-01-01

## 2024-12-18 ENCOUNTER — APPOINTMENT (OUTPATIENT)
Dept: BARIATRICS | Facility: HOSPITAL | Age: 34
End: 2024-12-18

## 2024-12-18 ENCOUNTER — INPATIENT (INPATIENT)
Facility: HOSPITAL | Age: 34
LOS: 0 days | Discharge: HOME | DRG: 641 | End: 2024-12-19
Attending: SURGERY | Admitting: SURGERY
Payer: MEDICAID

## 2024-12-18 ENCOUNTER — TRANSCRIPTION ENCOUNTER (OUTPATIENT)
Age: 34
End: 2024-12-18

## 2024-12-18 ENCOUNTER — RESULT REVIEW (OUTPATIENT)
Age: 34
End: 2024-12-18

## 2024-12-18 VITALS
SYSTOLIC BLOOD PRESSURE: 135 MMHG | HEART RATE: 78 BPM | OXYGEN SATURATION: 100 % | RESPIRATION RATE: 18 BRPM | DIASTOLIC BLOOD PRESSURE: 71 MMHG | HEIGHT: 61 IN | TEMPERATURE: 98 F | WEIGHT: 248.9 LBS

## 2024-12-18 DIAGNOSIS — E66.01 MORBID (SEVERE) OBESITY DUE TO EXCESS CALORIES: ICD-10-CM

## 2024-12-18 DIAGNOSIS — G47.33 OBSTRUCTIVE SLEEP APNEA (ADULT) (PEDIATRIC): ICD-10-CM

## 2024-12-18 LAB — HCG UR QL: NEGATIVE — SIGNIFICANT CHANGE UP

## 2024-12-18 PROCEDURE — 43775 LAP SLEEVE GASTRECTOMY: CPT

## 2024-12-18 PROCEDURE — 88307 TISSUE EXAM BY PATHOLOGIST: CPT | Mod: 26

## 2024-12-18 PROCEDURE — 43281 LAP PARAESOPHAG HERN REPAIR: CPT | Mod: 59

## 2024-12-18 PROCEDURE — 99222 1ST HOSP IP/OBS MODERATE 55: CPT

## 2024-12-18 DEVICE — SEALANT VISTASEAL FIBRIN HUMAN 10ML 12/KIT: Type: IMPLANTABLE DEVICE | Status: FUNCTIONAL

## 2024-12-18 DEVICE — STAPLER COVIDIEN TRI-STAPLE 60MM PURPLE INTELLIGENT RELOAD: Type: IMPLANTABLE DEVICE | Status: FUNCTIONAL

## 2024-12-18 DEVICE — STAPLER COVIDIEN TRI-STAPLE 45MM PURPLE INTELLIGENT RELOAD: Type: IMPLANTABLE DEVICE | Status: FUNCTIONAL

## 2024-12-18 RX ORDER — ACETAMINOPHEN 500MG 500 MG/1
1000 TABLET, COATED ORAL ONCE
Refills: 0 | Status: COMPLETED | OUTPATIENT
Start: 2024-12-19 | End: 2024-12-19

## 2024-12-18 RX ORDER — FOSAPREPITANT 150 MG/5ML
150 INJECTION, POWDER, LYOPHILIZED, FOR SOLUTION INTRAVENOUS ONCE
Refills: 0 | Status: COMPLETED | OUTPATIENT
Start: 2024-12-18 | End: 2024-12-18

## 2024-12-18 RX ORDER — PANTOPRAZOLE SODIUM 40 MG/1
40 TABLET, DELAYED RELEASE ORAL DAILY
Refills: 0 | Status: DISCONTINUED | OUTPATIENT
Start: 2024-12-18 | End: 2024-12-19

## 2024-12-18 RX ORDER — SODIUM CHLORIDE 9 MG/ML
2000 INJECTION, SOLUTION INTRAMUSCULAR; INTRAVENOUS; SUBCUTANEOUS ONCE
Refills: 0 | Status: COMPLETED | OUTPATIENT
Start: 2024-12-18 | End: 2024-12-18

## 2024-12-18 RX ORDER — HYDROMORPHONE HYDROCHLORIDE 2 MG/1
0.5 TABLET ORAL
Refills: 0 | Status: DISCONTINUED | OUTPATIENT
Start: 2024-12-18 | End: 2024-12-18

## 2024-12-18 RX ORDER — ENOXAPARIN SODIUM 30 MG/.3ML
40 INJECTION SUBCUTANEOUS EVERY 24 HOURS
Refills: 0 | Status: DISCONTINUED | OUTPATIENT
Start: 2024-12-19 | End: 2024-12-19

## 2024-12-18 RX ORDER — ACETAMINOPHEN 500MG 500 MG/1
1000 TABLET, COATED ORAL ONCE
Refills: 0 | Status: COMPLETED | OUTPATIENT
Start: 2024-12-18 | End: 2024-12-18

## 2024-12-18 RX ORDER — 0.9 % SODIUM CHLORIDE 0.9 %
1000 INTRAVENOUS SOLUTION INTRAVENOUS
Refills: 0 | Status: DISCONTINUED | OUTPATIENT
Start: 2024-12-18 | End: 2024-12-19

## 2024-12-18 RX ORDER — ONDANSETRON HYDROCHLORIDE 4 MG/1
4 TABLET, FILM COATED ORAL EVERY 6 HOURS
Refills: 0 | Status: DISCONTINUED | OUTPATIENT
Start: 2024-12-18 | End: 2024-12-19

## 2024-12-18 RX ORDER — INFLUENZA VIRUS VACCINE 15; 15; 15; 15 UG/.5ML; UG/.5ML; UG/.5ML; UG/.5ML
0.5 SUSPENSION INTRAMUSCULAR ONCE
Refills: 0 | Status: DISCONTINUED | OUTPATIENT
Start: 2024-12-18 | End: 2024-12-19

## 2024-12-18 RX ORDER — SIMETHICONE 125 MG
80 CAPSULE ORAL EVERY 8 HOURS
Refills: 0 | Status: DISCONTINUED | OUTPATIENT
Start: 2024-12-18 | End: 2024-12-19

## 2024-12-18 RX ORDER — HYDROMORPHONE HYDROCHLORIDE 2 MG/1
0.5 TABLET ORAL ONCE
Refills: 0 | Status: DISCONTINUED | OUTPATIENT
Start: 2024-12-18 | End: 2024-12-18

## 2024-12-18 RX ORDER — 0.9 % SODIUM CHLORIDE 0.9 %
1000 INTRAVENOUS SOLUTION INTRAVENOUS
Refills: 0 | Status: DISCONTINUED | OUTPATIENT
Start: 2024-12-18 | End: 2024-12-18

## 2024-12-18 RX ORDER — ACETAMINOPHEN 500MG 500 MG/1
1000 TABLET, COATED ORAL ONCE
Refills: 0 | Status: DISCONTINUED | OUTPATIENT
Start: 2024-12-19 | End: 2024-12-19

## 2024-12-18 RX ORDER — IBUPROFEN 200 MG
800 TABLET ORAL EVERY 6 HOURS
Refills: 0 | Status: DISCONTINUED | OUTPATIENT
Start: 2024-12-18 | End: 2024-12-19

## 2024-12-18 RX ORDER — ONDANSETRON HYDROCHLORIDE 4 MG/1
4 TABLET, FILM COATED ORAL ONCE
Refills: 0 | Status: DISCONTINUED | OUTPATIENT
Start: 2024-12-18 | End: 2024-12-18

## 2024-12-18 RX ORDER — ENOXAPARIN SODIUM 30 MG/.3ML
40 INJECTION SUBCUTANEOUS ONCE
Refills: 0 | Status: COMPLETED | OUTPATIENT
Start: 2024-12-18 | End: 2024-12-18

## 2024-12-18 RX ORDER — HYOSCYAMINE SULFATE 0.125 MG
0.12 TABLET, SUBLINGUAL SUBLINGUAL EVERY 6 HOURS
Refills: 0 | Status: DISCONTINUED | OUTPATIENT
Start: 2024-12-18 | End: 2024-12-19

## 2024-12-18 RX ORDER — CEFAZOLIN SODIUM 10 G
2000 VIAL (EA) INJECTION ONCE
Refills: 0 | Status: COMPLETED | OUTPATIENT
Start: 2024-12-18 | End: 2024-12-18

## 2024-12-18 RX ADMIN — ENOXAPARIN SODIUM 40 MILLIGRAM(S): 30 INJECTION SUBCUTANEOUS at 07:03

## 2024-12-18 RX ADMIN — Medication 400 MILLIGRAM(S): at 23:53

## 2024-12-18 RX ADMIN — Medication 800 MILLIGRAM(S): at 18:37

## 2024-12-18 RX ADMIN — ACETAMINOPHEN 500MG 400 MILLIGRAM(S): 500 TABLET, COATED ORAL at 20:54

## 2024-12-18 RX ADMIN — Medication 75 MILLILITER(S): at 09:51

## 2024-12-18 RX ADMIN — HYDROMORPHONE HYDROCHLORIDE 0.5 MILLIGRAM(S): 2 TABLET ORAL at 13:50

## 2024-12-18 RX ADMIN — ACETAMINOPHEN 500MG 1000 MILLIGRAM(S): 500 TABLET, COATED ORAL at 21:30

## 2024-12-18 RX ADMIN — PANTOPRAZOLE SODIUM 40 MILLIGRAM(S): 40 TABLET, DELAYED RELEASE ORAL at 13:52

## 2024-12-18 RX ADMIN — ACETAMINOPHEN 500MG 1000 MILLIGRAM(S): 500 TABLET, COATED ORAL at 16:25

## 2024-12-18 RX ADMIN — Medication 150 MILLILITER(S): at 10:12

## 2024-12-18 RX ADMIN — SODIUM CHLORIDE 2000 MILLILITER(S): 9 INJECTION, SOLUTION INTRAMUSCULAR; INTRAVENOUS; SUBCUTANEOUS at 06:56

## 2024-12-18 RX ADMIN — Medication 400 MILLIGRAM(S): at 17:56

## 2024-12-18 RX ADMIN — ONDANSETRON HYDROCHLORIDE 4 MILLIGRAM(S): 4 TABLET, FILM COATED ORAL at 20:54

## 2024-12-18 RX ADMIN — FOSAPREPITANT 300 MILLIGRAM(S): 150 INJECTION, POWDER, LYOPHILIZED, FOR SOLUTION INTRAVENOUS at 06:57

## 2024-12-18 RX ADMIN — Medication 400 MILLIGRAM(S): at 10:18

## 2024-12-18 RX ADMIN — ACETAMINOPHEN 500MG 400 MILLIGRAM(S): 500 TABLET, COATED ORAL at 15:25

## 2024-12-18 RX ADMIN — Medication 800 MILLIGRAM(S): at 10:20

## 2024-12-18 RX ADMIN — ONDANSETRON HYDROCHLORIDE 4 MILLIGRAM(S): 4 TABLET, FILM COATED ORAL at 13:52

## 2024-12-18 RX ADMIN — HYDROMORPHONE HYDROCHLORIDE 0.5 MILLIGRAM(S): 2 TABLET ORAL at 10:10

## 2024-12-18 RX ADMIN — HYDROMORPHONE HYDROCHLORIDE 0.5 MILLIGRAM(S): 2 TABLET ORAL at 14:50

## 2024-12-18 NOTE — CONSULT NOTE ADULT - SUBJECTIVE AND OBJECTIVE BOX
INTERVAL HPI/OVERNIGHT EVENTS:   35yo F w/ PMHx VIVIANA on CPAP, obesity presents for a scheduled laparoscopic sleeve gastrectomy with Dr. Diaz on 12/18/24  Pt speaks Indonesian only. DNA SEQ  services were utilized for my interaction with the patient.  # [id number]  Pt seen and evaluated at the bedside. No acute overnight events occurred. Pt reports moderate pain, located in mid-epigastrium, nonradiating, worse w/ palpation. Pt also reports some neck discomfort. She states that she feels extremely tired since waking from anesthesia and feels like her symptoms will likely resolve w/ rest. Neck discomfort is not reproducible to palpation. Non-radiating. Located circumferentially around her neck. She has no respiratory discomfort. She's breathing calmly and evenly. She does not feel short of breath. No chest pain. No other a complaints at this time.     ----  PAST MEDICAL & SURGICAL HISTORY:  Obesity, morbid, BMI 40.0-49.9      VIVIANA on CPAP          ----  Home Medications:  Tylenol 500 mg oral tablet: 2 tab(s) orally as needed for  mild pain (18 Dec 2024 06:37)      ----  FAMILY HISTORY:  no relevant family history reported related to obesity    ----  Allergies    No Known Allergies    Intolerances        ----  Social History:  - etoh: denies  - tobacco: denies  - recreational drug use: denies  - occupation: works as a cook  - ambulation status: independent    ----  REVIEW OF SYSTEMS:  CONSTITUTIONAL: denies fever, chills  HEENT: denies blurred vision, sore throat  SKIN: denies new lesions, rash  CARDIOVASCULAR: as per HPI  RESPIRATORY: as per HPI  GASTROINTESTINAL: as per HPI  GENITOURINARY: denies dysuria, discharge  NEUROLOGICAL: denies numbness, headache, focal weakness  MUSCULOSKELETAL: denies new joint pain, muscle aches  HEMATOLOGIC: denies gross bleeding, bruising    ----  PHYSICAL EXAM:  T(C): 36.3 (12-18-24 @ 12:07), Max: 36.7 (12-18-24 @ 06:37)  HR: 82 (12-18-24 @ 13:47) (74 - 83)  BP: 112/71 (12-18-24 @ 13:47) (112/71 - 147/88)  RR: 18 (12-18-24 @ 13:47) (15 - 21)  SpO2: 99% (12-18-24 @ 13:47) (97% - 100%)  Wt(kg): --  GENERAL: patient appears mildly uncomfortable, nontoxic, no distress  EYES: sclera clear, no exudates  ENMT: oropharynx clear without erythema, dry mucous membranes  NECK: supple, soft, no thyromegaly noted  LUNGS: reduced air entry bilaterally, no wheezing, no accessory muscle use  HEART: S1/S2, regular rate, no murmurs, distant heart sounds  GASTROINTESTINAL: abdomen is softly distended, hypoactive bowel sounds  INTEGUMENT: skin is warm, no diaphoresis  MUSCULOSKELETAL: no clubbing or cyanosis, no obvious deformity  NEUROLOGIC: awake, alert, good muscle tone in 4 extremities, no obvious sensory deficits     ----  I&O's Summary    17 Dec 2024 07:01  -  18 Dec 2024 07:00  --------------------------------------------------------  IN: 2150 mL / OUT: 0 mL / NET: 2150 mL    18 Dec 2024 07:01  -  18 Dec 2024 17:47  --------------------------------------------------------  IN: 1050 mL / OUT: 600 mL / NET: 450 mL    ----  Personally reviewed:  Vital sign trends: afebrile, stable hemodynamics  Consultant recommendations:  reviewed recs of bariatric surgery team    ----  MEDS:  acetaminophen   IVPB .. 1000 milliGRAM(s) IV Intermittent once  hyoscyamine SL 0.125 milliGRAM(s) SubLingual every 6 hours PRN  ibuprofen IVPB .. 800 milliGRAM(s) IV Intermittent every 6 hours  influenza   Vaccine 0.5 milliLiter(s) IntraMuscular once  lactated ringers. 1000 milliLiter(s) IV Continuous <Continuous>  ondansetron Injectable 4 milliGRAM(s) IV Push every 6 hours  pantoprazole  Injectable 40 milliGRAM(s) IV Push daily  simethicone 80 milliGRAM(s) Chew every 8 hours PRN

## 2024-12-18 NOTE — CONSULT NOTE ADULT - ASSESSMENT
35yo F w/ PMHx VIVIANA on CPAP, obesity presents for a scheduled laparoscopic sleeve gastrectomy with Dr. Diaz on 12/18/24    #morbid obesity  - NPO, IVF  - remote tele x24hrs, check daily electrolyte panel while admitted  - wean O2 as tolerated  - monitor for orthostasis  - supportive care: zofran, levsin, simethicone, gi ppx (pantoprazole)  - pain control: tylenol scheduled x24hrs, iv ibuprofen (prn moderate), dilaudid on POD0 (prn severe) then switch to oxycodone on POD1 (prn severe)  - encourage the use of non-narcotic analgesia  - vte ppx: scd's, add lovenox POD1  - nutrition consult  - encourage ambulation  - emotional support and counseling provided to patient and family     #VIVIANA on CPAP  - pt can use her own mask    #VTE ppx: SCD's for today, lovenox for tomorrow  #GI ppx: continue PPI  #Diet: Diet, NPO: With Ice Chips/Sips of Water (12-18-24 @ 09:56) [Active]  #Activity: Activity - Ambulate as Tolerated:   Time/Priority:  Routine   Additional Instructions:  Every four(4) hours (12-18-24 @ 09:56) [Active]  #ACP: full code  #Dispo: further plans pending clinical course    Thank you for allowing us to participate in the care of this patient. Our team will continue to follow along with you. Further recommendations to follow pending the clinical course.

## 2024-12-18 NOTE — BRIEF OPERATIVE NOTE - OPERATION/FINDINGS
Optiview entry into the abdomen, additional ports were introduced to the abdomen under direct visualization. Liver retracted with iron intern. Lesser sac entered. Short gastrics divided, small hiatal hernia repaired. Stomach divided over bougie, using stapler. The abdominal cavity was inspected. Hemostasis achieved. Intra-op EGD was performed, leak test negative. The stomach remnant was removed with no gross spillage. Vistaseal over staple line.

## 2024-12-18 NOTE — PATIENT PROFILE ADULT - FALL HARM RISK - UNIVERSAL INTERVENTIONS
Bed in lowest position, wheels locked, appropriate side rails in place/Call bell, personal items and telephone in reach/Instruct patient to call for assistance before getting out of bed or chair/Non-slip footwear when patient is out of bed/[place] to call system/Physically safe environment - no spills, clutter or unnecessary equipment/Purposeful Proactive Rounding/Room/bathroom lighting operational, light cord in reach

## 2024-12-18 NOTE — BRIEF OPERATIVE NOTE - NSICDXBRIEFPOSTOP_GEN_ALL_CORE_FT
POST-OP DIAGNOSIS:  Morbid obesity with BMI of 45.0-49.9, adult 18-Dec-2024 10:00:30  Ira Del Castillo

## 2024-12-18 NOTE — BRIEF OPERATIVE NOTE - NSICDXBRIEFPREOP_GEN_ALL_CORE_FT
PRE-OP DIAGNOSIS:  Morbid obesity with BMI of 45.0-49.9, adult 18-Dec-2024 10:00:16  Ira Del Castillo

## 2024-12-19 ENCOUNTER — TRANSCRIPTION ENCOUNTER (OUTPATIENT)
Age: 34
End: 2024-12-19

## 2024-12-19 VITALS
SYSTOLIC BLOOD PRESSURE: 113 MMHG | RESPIRATION RATE: 18 BRPM | HEART RATE: 65 BPM | TEMPERATURE: 99 F | OXYGEN SATURATION: 93 % | DIASTOLIC BLOOD PRESSURE: 71 MMHG

## 2024-12-19 LAB
ANION GAP SERPL CALC-SCNC: 7 MMOL/L — SIGNIFICANT CHANGE UP (ref 5–17)
BUN SERPL-MCNC: 8 MG/DL — SIGNIFICANT CHANGE UP (ref 7–23)
CALCIUM SERPL-MCNC: 9 MG/DL — SIGNIFICANT CHANGE UP (ref 8.5–10.1)
CHLORIDE SERPL-SCNC: 113 MMOL/L — HIGH (ref 96–108)
CO2 SERPL-SCNC: 21 MMOL/L — LOW (ref 22–31)
CREAT SERPL-MCNC: 0.6 MG/DL — SIGNIFICANT CHANGE UP (ref 0.5–1.3)
EGFR: 120 ML/MIN/1.73M2 — SIGNIFICANT CHANGE UP
GLUCOSE SERPL-MCNC: 110 MG/DL — HIGH (ref 70–99)
HCT VFR BLD CALC: 36.4 % — SIGNIFICANT CHANGE UP (ref 34.5–45)
HGB BLD-MCNC: 12.5 G/DL — SIGNIFICANT CHANGE UP (ref 11.5–15.5)
MAGNESIUM SERPL-MCNC: 2.1 MG/DL — SIGNIFICANT CHANGE UP (ref 1.6–2.6)
MCHC RBC-ENTMCNC: 31.2 PG — SIGNIFICANT CHANGE UP (ref 27–34)
MCHC RBC-ENTMCNC: 34.3 G/DL — SIGNIFICANT CHANGE UP (ref 32–36)
MCV RBC AUTO: 90.8 FL — SIGNIFICANT CHANGE UP (ref 80–100)
NRBC # BLD: 0 /100 WBCS — SIGNIFICANT CHANGE UP (ref 0–0)
PHOSPHATE SERPL-MCNC: 1.9 MG/DL — LOW (ref 2.5–4.5)
PLATELET # BLD AUTO: 251 K/UL — SIGNIFICANT CHANGE UP (ref 150–400)
POTASSIUM SERPL-MCNC: 3.9 MMOL/L — SIGNIFICANT CHANGE UP (ref 3.5–5.3)
POTASSIUM SERPL-SCNC: 3.9 MMOL/L — SIGNIFICANT CHANGE UP (ref 3.5–5.3)
RBC # BLD: 4.01 M/UL — SIGNIFICANT CHANGE UP (ref 3.8–5.2)
RBC # FLD: 13.1 % — SIGNIFICANT CHANGE UP (ref 10.3–14.5)
SODIUM SERPL-SCNC: 141 MMOL/L — SIGNIFICANT CHANGE UP (ref 135–145)
SURGICAL PATHOLOGY STUDY: SIGNIFICANT CHANGE UP
WBC # BLD: 9.87 K/UL — SIGNIFICANT CHANGE UP (ref 3.8–10.5)
WBC # FLD AUTO: 9.87 K/UL — SIGNIFICANT CHANGE UP (ref 3.8–10.5)

## 2024-12-19 PROCEDURE — 86901 BLOOD TYPING SEROLOGIC RH(D): CPT

## 2024-12-19 PROCEDURE — 81025 URINE PREGNANCY TEST: CPT

## 2024-12-19 PROCEDURE — 99024 POSTOP FOLLOW-UP VISIT: CPT

## 2024-12-19 PROCEDURE — C1889: CPT

## 2024-12-19 PROCEDURE — 86900 BLOOD TYPING SEROLOGIC ABO: CPT

## 2024-12-19 PROCEDURE — 83735 ASSAY OF MAGNESIUM: CPT

## 2024-12-19 PROCEDURE — 36415 COLL VENOUS BLD VENIPUNCTURE: CPT

## 2024-12-19 PROCEDURE — 86850 RBC ANTIBODY SCREEN: CPT

## 2024-12-19 PROCEDURE — 84100 ASSAY OF PHOSPHORUS: CPT

## 2024-12-19 PROCEDURE — C9399: CPT

## 2024-12-19 PROCEDURE — 85027 COMPLETE CBC AUTOMATED: CPT

## 2024-12-19 PROCEDURE — 80048 BASIC METABOLIC PNL TOTAL CA: CPT

## 2024-12-19 PROCEDURE — 88307 TISSUE EXAM BY PATHOLOGIST: CPT

## 2024-12-19 RX ORDER — OMEPRAZOLE 20 MG/1
20 CAPSULE, DELAYED RELEASE ORAL
Qty: 0 | Refills: 0 | DISCHARGE

## 2024-12-19 RX ORDER — ACETAMINOPHEN 500MG 500 MG/1
2 TABLET, COATED ORAL
Refills: 0 | DISCHARGE

## 2024-12-19 RX ORDER — OXYCODONE HYDROCHLORIDE 30 MG/1
1 TABLET ORAL
Qty: 0 | Refills: 0 | DISCHARGE

## 2024-12-19 RX ORDER — ONDANSETRON HYDROCHLORIDE 4 MG/1
1 TABLET, FILM COATED ORAL
Qty: 0 | Refills: 0 | DISCHARGE

## 2024-12-19 RX ORDER — ACETAMINOPHEN 500MG 500 MG/1
2 TABLET, COATED ORAL
Qty: 0 | Refills: 0 | DISCHARGE

## 2024-12-19 RX ORDER — SIMETHICONE 125 MG
1 CAPSULE ORAL
Qty: 0 | Refills: 0 | DISCHARGE

## 2024-12-19 RX ADMIN — ACETAMINOPHEN 500MG 1000 MILLIGRAM(S): 500 TABLET, COATED ORAL at 08:45

## 2024-12-19 RX ADMIN — ONDANSETRON HYDROCHLORIDE 4 MILLIGRAM(S): 4 TABLET, FILM COATED ORAL at 13:08

## 2024-12-19 RX ADMIN — Medication 800 MILLIGRAM(S): at 05:45

## 2024-12-19 RX ADMIN — Medication 400 MILLIGRAM(S): at 05:04

## 2024-12-19 RX ADMIN — PANTOPRAZOLE SODIUM 40 MILLIGRAM(S): 40 TABLET, DELAYED RELEASE ORAL at 11:28

## 2024-12-19 RX ADMIN — ACETAMINOPHEN 500MG 400 MILLIGRAM(S): 500 TABLET, COATED ORAL at 02:43

## 2024-12-19 RX ADMIN — ONDANSETRON HYDROCHLORIDE 4 MILLIGRAM(S): 4 TABLET, FILM COATED ORAL at 08:23

## 2024-12-19 RX ADMIN — ACETAMINOPHEN 500MG 1000 MILLIGRAM(S): 500 TABLET, COATED ORAL at 03:30

## 2024-12-19 RX ADMIN — Medication 400 MILLIGRAM(S): at 11:29

## 2024-12-19 RX ADMIN — Medication 800 MILLIGRAM(S): at 00:30

## 2024-12-19 RX ADMIN — ONDANSETRON HYDROCHLORIDE 4 MILLIGRAM(S): 4 TABLET, FILM COATED ORAL at 02:43

## 2024-12-19 RX ADMIN — ACETAMINOPHEN 500MG 400 MILLIGRAM(S): 500 TABLET, COATED ORAL at 08:22

## 2024-12-19 RX ADMIN — Medication 150 MILLILITER(S): at 13:09

## 2024-12-19 RX ADMIN — Medication 800 MILLIGRAM(S): at 12:00

## 2024-12-19 RX ADMIN — ENOXAPARIN SODIUM 40 MILLIGRAM(S): 30 INJECTION SUBCUTANEOUS at 06:05

## 2024-12-19 NOTE — DISCHARGE NOTE PROVIDER - CARE PROVIDER_API CALL
Evans Diaz  Surgery  [address] [place] [id number]  Phone: [phone]  Fax: [phone]  Established Patient  Follow Up Time: 1 week   [Vision Problems] : vision problems [Hearing Loss] : no hearing loss [Chest Pain] : no chest pain [Shortness Of Breath] : no shortness of breath [Constipation] : no constipation [Diarrhea] : no diarrhea

## 2024-12-19 NOTE — DISCHARGE NOTE NURSING/CASE MANAGEMENT/SOCIAL WORK - PATIENT PORTAL LINK FT
You can access the FollowMyHealth Patient Portal offered by [facility] by registering at the following website: [email or web address]. By joining "[employer]’s FollowMyHealth portal, you will also be able to view your health information using other applications (apps) compatible with our system.

## 2024-12-19 NOTE — PROGRESS NOTE ADULT - ASSESSMENT
Uneventful night s/p Lap Sleeve Gastrectomy.  OOB and ambulating.   Pain controlled.  Will start Bariatric Stage I diet this morning.  Monitor PO intake.  Discharge planning for this afternoon  if tolerating diet and blood work appropriate.  Continue DVT prophylaxis  Encourage incentive spirometry.    Postoperative instructions have been provided including avoidance of heavy lifting and strenuous activities in excess of 20-25 pounds for duration of 4-6 weeks. The patient may shower keeping the incisions clean, dry, covered as needed.  F/u with me one week.
35 yo female, pmh morbid obesity, now s/p lap sleeve gastrectomy. Recovering well post operatively    Plan:  - NPO with S&C, BCLD in AM  - AM labs  - I&O's  - DVT ppx, LVX to start in AM  - OOBA/IS  - PPI  - home meds as appropriate  - pain/nausea management PRN

## 2024-12-19 NOTE — CHART NOTE - NSCHARTNOTEFT_GEN_A_CORE
Pt seen by a Registered Dietitian prior to surgery for comprehensive nutrition assessment and thorough nutrition education. Nutrition consult in medical record. Pt appears to have good comprehension of diet regimen. Reviewed and reinforced diet regimen with patient. Also, advised and encouraged pt to comply with guidelines for proper hydration, adequate protein intake and vitamin usage. Provided post-op nutrition education material/business card.    Jenni Andrade RD

## 2024-12-19 NOTE — PHARMACOTHERAPY INTERVENTION NOTE - COMMENTS
Meds to beds requested by provider.    The following prescriptions were filled at [facility]:  Oxycodone, omeprazole, ondansetron, acetaminophen, Bariatric Fusion vitamins, simethicone    Patient counseled by pharmacist on indication, directions, and side effects.  Patient verbalized understanding and had no further questions.    Counseling materials provided/counseling aids used: Bariatric Surgery 1 Pager, Sleeve Gastrectomy or Gastric Bypass and Your Medication  Time spent Counseling: 15 minutes

## 2024-12-19 NOTE — DISCHARGE NOTE PROVIDER - NSDCCPTREATMENT_GEN_ALL_CORE_FT
PRINCIPAL PROCEDURE  Procedure: Gastrectomy, sleeve, laparoscopic, with EDG  Findings and Treatment:

## 2024-12-19 NOTE — PROGRESS NOTE ADULT - SUBJECTIVE AND OBJECTIVE BOX
POD # 1 s/p Lap Sleeve with repair of Hiatal hernia    Vital Signs Last 24 Hrs  T(C): 36.8 (19 Dec 2024 04:03), Max: 37.2 (19 Dec 2024 00:03)  T(F): 98.2 (19 Dec 2024 04:03), Max: 99 (19 Dec 2024 00:03)  HR: 72 (19 Dec 2024 04:03) (72 - 83)  BP: 118/73 (19 Dec 2024 04:03) (112/71 - 147/88)  BP(mean): --  RR: 18 (19 Dec 2024 04:03) (15 - 21)  SpO2: 94% (19 Dec 2024 04:03) (94% - 100%)    Parameters below as of 19 Dec 2024 04:03  Patient On (Oxygen Delivery Method): room air        12-18 @ 07:01  -  12-19 @ 07:00  --------------------------------------------------------  IN: 4170 mL / OUT: 2600 mL / NET: 1570 mL          Labs pending          Physical Exam:  Awake alert and oriented x3 in no acute distress. NCAT, PERRLA, EOMI  Lungs clear bilaterally without wheezes rhonchi or rales.  Regular rate and rhythm  Abdomen soft, nontender, nondistended, positive bowel sounds in all 4 quadrants. No evidence of hernia or masses. Surgical incisions remained well approximated and healing appropriately without erythema, induration or fluctuance. Dressings remained clean dry and intact  Extremities without edema or tenderness.
s/p Lap Sleeve Gastrectomy  Dr. Diaz     SUBJECTIVE:  Patient seen and examined at bedside post operatively, denies any acute complaints, denies any abdominal pain but reports pain in her upper chest radiating to her shoulders. Denies any gas, voided once and has tolerated some small sips of water. Denies any nausea or vomiting, difficulty breathing or shortness of breath. OOB to the bathroom.    MEDICATIONS  (STANDING):  acetaminophen   IVPB .. 1000 milliGRAM(s) IV Intermittent once  ibuprofen IVPB .. 800 milliGRAM(s) IV Intermittent every 6 hours  influenza   Vaccine 0.5 milliLiter(s) IntraMuscular once  lactated ringers. 1000 milliLiter(s) (150 mL/Hr) IV Continuous <Continuous>  ondansetron Injectable 4 milliGRAM(s) IV Push every 6 hours  pantoprazole  Injectable 40 milliGRAM(s) IV Push daily    MEDICATIONS  (PRN):  hyoscyamine SL 0.125 milliGRAM(s) SubLingual every 6 hours PRN nausea/vomiting  simethicone 80 milliGRAM(s) Chew every 8 hours PRN post op gas      Vital Signs Last 24 Hrs  T(C): 36.3 (18 Dec 2024 12:07), Max: 36.7 (18 Dec 2024 06:37)  T(F): 97.4 (18 Dec 2024 12:07), Max: 98 (18 Dec 2024 06:37)  HR: 82 (18 Dec 2024 13:47) (74 - 83)  BP: 112/71 (18 Dec 2024 13:47) (112/71 - 147/88)  BP(mean): --  RR: 18 (18 Dec 2024 13:47) (15 - 21)  SpO2: 99% (18 Dec 2024 13:47) (97% - 100%)    Parameters below as of 18 Dec 2024 13:47  Patient On (Oxygen Delivery Method): room air        PHYSICAL EXAM:  Constitutional: AAOx3, no acute distress  HEENT: NCAT, airway patent  Cardiovascular: RRR, pulses present bilaterally  Respiratory: nonlabored breathing  Gastrointestinal: abdomen soft, nontender, non distended, no rebound or guarding, no palpable masses, incisions are clean, dry and intact without any surrounding erythema, drainage, bleeding or signs of infection   Neuro: no focal deficits  Extremities: non edematous, no calf pain bilaterally     I&O's Detail    17 Dec 2024 07:01  -  18 Dec 2024 07:00  --------------------------------------------------------  IN:    IV PiggyBack: 150 mL    Sodium Chloride 0.9% Bolus: 2000 mL  Total IN: 2150 mL    OUT:  Total OUT: 0 mL    Total NET: 2150 mL      18 Dec 2024 07:01  -  18 Dec 2024 17:21  --------------------------------------------------------  IN:    Lactated Ringers: 1050 mL  Total IN: 1050 mL    OUT:    Oral Fluid: 0 mL    Voided (mL): 600 mL  Total OUT: 600 mL    Total NET: 450 mL

## 2024-12-19 NOTE — DISCHARGE NOTE PROVIDER - NSDCMRMEDTOKEN_GEN_ALL_CORE_FT
acetaminophen 500 mg oral powder: 2 packet(s) orally every 6 hours as needed for pain after surgery. DO NOT exceed the maximum daily dose of acetaminophen of 4000 mg.  Omeprazole: 20 milligram(s) once a day before breakfast  oxyCODONE 5 mg oral tablet: 1 tab(s) orally every 6 hours as needed for  severe pain after surgery.  simethicone 125 mg oral tablet, chewable: 1 tab(s) chewed every 8 hours As needed for gas.  Zofran ODT 4 mg oral tablet, disintegratin tab(s) orally every 8 hours as needed for nausea.

## 2024-12-19 NOTE — DISCHARGE NOTE PROVIDER - NSDCCPCAREPLAN_GEN_ALL_CORE_FT
PRINCIPAL DISCHARGE DIAGNOSIS  Diagnosis: Obesity, morbid, BMI 40.0-49.9  Assessment and Plan of Treatment:

## 2024-12-19 NOTE — DISCHARGE NOTE NURSING/CASE MANAGEMENT/SOCIAL WORK - NSDCPEFALRISK_GEN_ALL_CORE
For information on Fall & Injury Prevention, visit: [email or web address] OR  [email or web address] OR  https://www.cdc.gov/steadi/patient.html

## 2024-12-19 NOTE — DISCHARGE NOTE NURSING/CASE MANAGEMENT/SOCIAL WORK - FINANCIAL ASSISTANCE
[facility] provides services at a reduced cost to those who are determined to be eligible through [facility]’s financial assistance program. Information regarding [facility]’s financial assistance program can be found by going to [email or web address] or by calling [phone].

## 2024-12-19 NOTE — DISCHARGE NOTE PROVIDER - NSDCFUSCHEDAPPT_GEN_ALL_CORE_FT
Evans Diaz  [facility] Physician [facility]  BARIATRIC [address]  Scheduled Appointment: 01/02/2025    Evans Diaz  [facility] Physician [facility]  BARIATRIC [address]  Scheduled Appointment: 01/23/2025    [facility] Physician [facility]  WEIGHTMGMT [address]  Scheduled Appointment: 01/28/2025

## 2024-12-19 NOTE — DISCHARGE NOTE PROVIDER - HOSPITAL COURSE
33 y/o female w/ PMH VIVIANA presented to [facility] for encounter for Laparoscopic Sleeve Gastrectomy w/ intra-operative EGD, which ocurred on 12/18/2024. The procedure was done without immediate complications. Pt observed overnight on med-surg floor.  On POD #1, pt was advanced to bariatric clear liquid diet and had consumed the required 10 1-ounce cups for discharge. Pt has been voiding, with satisfactory pain/nausea control.     Pt deemed surgically stable for discharge on ________  Dispo: f/u w/ Dr. Diaz in 1-2 weeks 35 y/o female w/ PMH VIVIANA presented to [facility] for encounter for Laparoscopic Sleeve Gastrectomy w/ intra-operative EGD, which ocurred on 12/18/2024. The procedure was done without immediate complications. Pt observed overnight on med-surg floor.  On POD #1, pt was advanced to bariatric clear liquid diet and had consumed the required 10 1-ounce cups for discharge. Pt has been voiding, with satisfactory pain/nausea control.     Pt deemed surgically stable for discharge on 12/19/2024  Dispo: f/u w/ Dr. Diaz in 1-2 weeks

## 2024-12-20 ENCOUNTER — NON-APPOINTMENT (OUTPATIENT)
Age: 34
End: 2024-12-20

## 2024-12-26 ENCOUNTER — APPOINTMENT (OUTPATIENT)
Dept: BARIATRICS | Facility: CLINIC | Age: 34
End: 2024-12-26
Payer: MEDICAID

## 2024-12-26 VITALS
BODY MASS INDEX: 43.61 KG/M2 | TEMPERATURE: 97.2 F | SYSTOLIC BLOOD PRESSURE: 128 MMHG | HEIGHT: 61 IN | WEIGHT: 231 LBS | HEART RATE: 109 BPM | OXYGEN SATURATION: 99 % | DIASTOLIC BLOOD PRESSURE: 78 MMHG

## 2024-12-26 DIAGNOSIS — E66.01 MORBID (SEVERE) OBESITY DUE TO EXCESS CALORIES: ICD-10-CM

## 2024-12-26 DIAGNOSIS — G47.33 OBSTRUCTIVE SLEEP APNEA (ADULT) (PEDIATRIC): ICD-10-CM

## 2024-12-26 DIAGNOSIS — Z98.84 BARIATRIC SURGERY STATUS: ICD-10-CM

## 2024-12-26 PROCEDURE — 99024 POSTOP FOLLOW-UP VISIT: CPT

## 2025-01-09 ENCOUNTER — APPOINTMENT (OUTPATIENT)
Dept: BARIATRICS | Facility: CLINIC | Age: 35
End: 2025-01-09
Payer: MEDICAID

## 2025-01-09 VITALS
BODY MASS INDEX: 42.54 KG/M2 | DIASTOLIC BLOOD PRESSURE: 88 MMHG | WEIGHT: 225.31 LBS | HEART RATE: 84 BPM | TEMPERATURE: 97.5 F | HEIGHT: 61 IN | OXYGEN SATURATION: 96 % | SYSTOLIC BLOOD PRESSURE: 108 MMHG

## 2025-01-09 DIAGNOSIS — E46 UNSPECIFIED PROTEIN-CALORIE MALNUTRITION: ICD-10-CM

## 2025-01-09 DIAGNOSIS — E66.01 MORBID (SEVERE) OBESITY DUE TO EXCESS CALORIES: ICD-10-CM

## 2025-01-09 DIAGNOSIS — G47.33 OBSTRUCTIVE SLEEP APNEA (ADULT) (PEDIATRIC): ICD-10-CM

## 2025-01-09 DIAGNOSIS — R63.8 OTHER SYMPTOMS AND SIGNS CONCERNING FOOD AND FLUID INTAKE: ICD-10-CM

## 2025-01-09 DIAGNOSIS — Z91.89 OTHER SPECIFIED PERSONAL RISK FACTORS, NOT ELSEWHERE CLASSIFIED: ICD-10-CM

## 2025-01-09 PROCEDURE — 99024 POSTOP FOLLOW-UP VISIT: CPT

## 2025-01-10 PROBLEM — R63.8 POOR FLUID INTAKE: Status: ACTIVE | Noted: 2025-01-10

## 2025-01-10 PROBLEM — Z91.89 AT RISK FOR DEHYDRATION DUE TO POOR FLUID INTAKE: Status: ACTIVE | Noted: 2025-01-10

## 2025-01-23 ENCOUNTER — APPOINTMENT (OUTPATIENT)
Dept: BARIATRICS | Facility: CLINIC | Age: 35
End: 2025-01-23
Payer: MEDICAID

## 2025-01-23 VITALS
WEIGHT: 222 LBS | OXYGEN SATURATION: 99 % | TEMPERATURE: 97.2 F | BODY MASS INDEX: 41.91 KG/M2 | HEART RATE: 87 BPM | DIASTOLIC BLOOD PRESSURE: 88 MMHG | HEIGHT: 61 IN | SYSTOLIC BLOOD PRESSURE: 134 MMHG

## 2025-01-23 DIAGNOSIS — E66.01 MORBID (SEVERE) OBESITY DUE TO EXCESS CALORIES: ICD-10-CM

## 2025-01-23 DIAGNOSIS — R63.4 ABNORMAL WEIGHT LOSS: ICD-10-CM

## 2025-01-23 DIAGNOSIS — Z86.39 PERSONAL HISTORY OF OTHER ENDOCRINE, NUTRITIONAL AND METABOLIC DISEASE: ICD-10-CM

## 2025-01-23 DIAGNOSIS — Z98.84 BARIATRIC SURGERY STATUS: ICD-10-CM

## 2025-01-23 PROCEDURE — 99024 POSTOP FOLLOW-UP VISIT: CPT

## 2025-01-23 RX ORDER — FAMOTIDINE 40 MG/1
40 TABLET, FILM COATED ORAL
Refills: 0 | Status: ACTIVE | COMMUNITY

## 2025-01-28 ENCOUNTER — APPOINTMENT (OUTPATIENT)
Dept: BARIATRICS/WEIGHT MGMT | Facility: CLINIC | Age: 35
End: 2025-01-28

## 2025-03-27 ENCOUNTER — APPOINTMENT (OUTPATIENT)
Dept: BARIATRICS | Facility: CLINIC | Age: 35
End: 2025-03-27
Payer: MEDICAID

## 2025-03-27 VITALS
WEIGHT: 213 LBS | SYSTOLIC BLOOD PRESSURE: 122 MMHG | OXYGEN SATURATION: 97 % | HEIGHT: 61 IN | DIASTOLIC BLOOD PRESSURE: 74 MMHG | BODY MASS INDEX: 40.22 KG/M2 | TEMPERATURE: 97.4 F | HEART RATE: 85 BPM

## 2025-03-27 DIAGNOSIS — Z90.3 POSTSURGICAL MALABSORPTION, NOT ELSEWHERE CLASSIFIED: ICD-10-CM

## 2025-03-27 DIAGNOSIS — Z00.00 ENCOUNTER FOR GENERAL ADULT MEDICAL EXAMINATION W/OUT ABNORMAL FINDINGS: ICD-10-CM

## 2025-03-27 DIAGNOSIS — E66.01 MORBID (SEVERE) OBESITY DUE TO EXCESS CALORIES: ICD-10-CM

## 2025-03-27 DIAGNOSIS — R63.4 ABNORMAL WEIGHT LOSS: ICD-10-CM

## 2025-03-27 DIAGNOSIS — K91.2 POSTSURGICAL MALABSORPTION, NOT ELSEWHERE CLASSIFIED: ICD-10-CM

## 2025-03-27 PROCEDURE — 99213 OFFICE O/P EST LOW 20 MIN: CPT

## 2025-05-19 NOTE — REASON FOR VISIT
19-May-2025 12:00 [Other: ____] : [unfilled] [Source: ______] : History obtained from [unfilled] [FreeTextEntry3] : Dr Evans Diaz

## 2025-06-19 ENCOUNTER — APPOINTMENT (OUTPATIENT)
Dept: BARIATRICS | Facility: CLINIC | Age: 35
End: 2025-06-19
Payer: MEDICAID

## 2025-06-19 VITALS
WEIGHT: 205 LBS | OXYGEN SATURATION: 97 % | BODY MASS INDEX: 38.71 KG/M2 | TEMPERATURE: 97.1 F | HEART RATE: 81 BPM | HEIGHT: 61 IN | SYSTOLIC BLOOD PRESSURE: 124 MMHG | DIASTOLIC BLOOD PRESSURE: 72 MMHG

## 2025-06-19 PROBLEM — E66.812 OBESITY, CLASS II, BMI 35-39.9: Status: ACTIVE | Noted: 2025-06-19

## 2025-06-19 PROCEDURE — 99214 OFFICE O/P EST MOD 30 MIN: CPT

## 2025-09-18 ENCOUNTER — APPOINTMENT (OUTPATIENT)
Dept: BARIATRICS | Facility: CLINIC | Age: 35
End: 2025-09-18

## (undated) DEVICE — DRAPE 3/4 SHEET W REINFORCEMENT 56X77"

## (undated) DEVICE — UNDERPAD LINEN SAVER 17 X 24"

## (undated) DEVICE — PLV/PSP-SUCTION IRRIGATOR STRYKER: Type: DURABLE MEDICAL EQUIPMENT

## (undated) DEVICE — CATH IV SAFE BC 22G X 1" (BLUE)

## (undated) DEVICE — SUT ENDOSTITCH DEVICE 10MM

## (undated) DEVICE — LUBRICATING JELLY HR ONE SHOT 3G

## (undated) DEVICE — VENODYNE/SCD SLEEVE CALF LARGE

## (undated) DEVICE — ENDOCATCH 10MM

## (undated) DEVICE — PLV-SCD MACHINE: Type: DURABLE MEDICAL EQUIPMENT

## (undated) DEVICE — TROCAR COVIDIEN VERSAPORT BLADELESS OPTICAL 11MM STANDARD

## (undated) DEVICE — BIOPSY FORCEP RADIAL JAW 4 STANDARD WITH NEEDLE

## (undated) DEVICE — SUT POLYSORB 3-0 30" V-20 UNDYED

## (undated) DEVICE — TROCAR COVIDIEN ENDO CLOSE SUTURING DEVICE

## (undated) DEVICE — LINE BREATHE SAMPLNG

## (undated) DEVICE — BITE BLOCK ADULT 20 X 27MM (GREEN)

## (undated) DEVICE — SUT ENDOSTITCH POLYSORB 2-0 48" ES-9

## (undated) DEVICE — TUBING IV SET GRAVITY 3Y 100" MACRO

## (undated) DEVICE — BIOPSY FORCEP COLD DISP

## (undated) DEVICE — WARMING BLANKET UPPER ADULT

## (undated) DEVICE — BASIN EMESIS 10IN GRADUATED MAUVE

## (undated) DEVICE — TUBING MEDI-VAC W MAXIGRIP CONNECTORS 1/4"X6'

## (undated) DEVICE — DENTURE CUP PINK

## (undated) DEVICE — PACK IV START WITH CHG

## (undated) DEVICE — TUBING STRYKER PNEUMOSURE HEATED RTP

## (undated) DEVICE — BLADE SCALPEL SAFETYLOCK #15

## (undated) DEVICE — SALIVA EJECTOR (BLUE)

## (undated) DEVICE — TROCAR COVIDIEN VERSAPORT BLADELESS OPTICAL 12MM STANDARD

## (undated) DEVICE — Device

## (undated) DEVICE — DRSG CURITY GAUZE SPONGE 4 X 4" 12-PLY NON-STERILE

## (undated) DEVICE — SHEARS COVIDIEN ENDO SHEAR 5MM X 31CM W UNIPOLAR CAUTERY

## (undated) DEVICE — ELCTR ECG CONDUCTIVE ADHESIVE

## (undated) DEVICE — VENODYNE/SCD SLEEVE CALF MEDIUM

## (undated) DEVICE — DRAPE TOWEL BLUE 17" X 24"

## (undated) DEVICE — TROCAR COVIDIEN VERSAONE BLUNT TIP HASSAN 12MM

## (undated) DEVICE — SUT MONOCRYL 4-0 27" PS-2 UNDYED

## (undated) DEVICE — GLV 7 PROTEXIS (WHITE)

## (undated) DEVICE — SUT POLYSORB 0 30" GU-46

## (undated) DEVICE — DRSG BANDAID 0.75X3"

## (undated) DEVICE — INSUFFLATION NDL COVIDIEN SURGINEEDLE VERESS 120MM

## (undated) DEVICE — GOWN LG

## (undated) DEVICE — PROTECTOR HEEL / ELBOW FLUFFY

## (undated) DEVICE — PACK GENERAL LAPAROSCOPY

## (undated) DEVICE — CLAMP BX HOT RAD JAW 3

## (undated) DEVICE — CONTAINER FORMALIN 10% 20ML

## (undated) DEVICE — DRSG 2X2

## (undated) DEVICE — CONTAINER FORMALIN 80ML YELLOW

## (undated) DEVICE — STAPLER COVIDIEN ENDO GIA STANDARD HANDLE

## (undated) DEVICE — SUT ENDOSTITCH SOFSILK 0 48" ES-9

## (undated) DEVICE — TROCAR COVIDIEN VERSAPORT BLADELESS OPTICAL 15MM STANDARD

## (undated) DEVICE — TROCAR COVIDIEN VERSAPORT BLADELESS OPTICAL 5MM STANDARD